# Patient Record
Sex: MALE | Race: BLACK OR AFRICAN AMERICAN | NOT HISPANIC OR LATINO | Employment: FULL TIME | URBAN - METROPOLITAN AREA
[De-identification: names, ages, dates, MRNs, and addresses within clinical notes are randomized per-mention and may not be internally consistent; named-entity substitution may affect disease eponyms.]

---

## 2018-03-27 LAB
ABSOL LYMPHOCYTES (HISTORICAL): 1.6 K/UL (ref 0.5–4)
ANION GAP SERPL CALCULATED.3IONS-SCNC: 13 MMOL/L (ref 5–14)
BASOPHILS # BLD AUTO: 0.1 K/UL (ref 0–0.1)
BASOPHILS # BLD AUTO: 1 % (ref 0–1)
BUN SERPL-MCNC: 13 MG/DL (ref 5–25)
CALCIUM SERPL-MCNC: 9.6 MG/DL (ref 8.4–10.2)
CHLORIDE SERPL-SCNC: 106 MEQ/L (ref 97–108)
CO2 SERPL-SCNC: 24 MMOL/L (ref 22–30)
COMMENT (HISTORICAL): ABNORMAL
CREATINE, SERUM (HISTORICAL): 0.79 MG/DL (ref 0.7–1.5)
DEPRECATED RDW RBC AUTO: 13.8 %
EGFR (HISTORICAL): >60 ML/MIN/1.73 M2
EOSINOPHIL # BLD AUTO: 0.2 K/UL (ref 0–0.4)
EOSINOPHIL NFR BLD AUTO: 4 % (ref 0–6)
GLUCOSE SERPL-MCNC: 84 MG/DL (ref 70–99)
HCT VFR BLD AUTO: 42.2 % (ref 41–53)
HGB BLD-MCNC: 14.4 G/DL (ref 13.5–17.5)
LYMPHOCYTES NFR BLD AUTO: 26 % (ref 25–45)
MCH RBC QN AUTO: 33.3 PG (ref 26–34)
MCHC RBC AUTO-ENTMCNC: 34 % (ref 31–36)
MCV RBC AUTO: 98 FL (ref 80–100)
METHYL ALCOHOL (HISTORICAL): 12 MG/DL
MONOCYTES # BLD AUTO: 0.6 K/UL (ref 0.2–0.9)
MONOCYTES NFR BLD AUTO: 10 % (ref 1–10)
NEUTROPHILS ABS COUNT (HISTORICAL): 3.5 K/UL (ref 1.8–7.8)
NEUTS SEG NFR BLD AUTO: 59 % (ref 45–65)
PLATELET # BLD AUTO: 214 K/MCL (ref 150–450)
POTASSIUM SERPL-SCNC: 4.4 MEQ/L (ref 3.6–5)
RBC # BLD AUTO: 4.31 M/MCL (ref 4.5–5.9)
RBC MORPHOLOGY (HISTORICAL): ABNORMAL
SODIUM SERPL-SCNC: 143 MEQ/L (ref 137–147)
TROPONIN I SERPL-MCNC: <0.01 NG/ML (ref 0–0.03)
WBC # BLD AUTO: 6 K/MCL (ref 4.5–11)

## 2019-01-22 ENCOUNTER — HOSPITAL ENCOUNTER (EMERGENCY)
Facility: HOSPITAL | Age: 39
Discharge: HOME | End: 2019-01-22
Attending: EMERGENCY MEDICINE | Admitting: EMERGENCY MEDICINE
Payer: COMMERCIAL

## 2019-01-22 ENCOUNTER — APPOINTMENT (EMERGENCY)
Dept: RADIOLOGY | Facility: HOSPITAL | Age: 39
End: 2019-01-22
Payer: COMMERCIAL

## 2019-01-22 VITALS
SYSTOLIC BLOOD PRESSURE: 120 MMHG | RESPIRATION RATE: 18 BRPM | DIASTOLIC BLOOD PRESSURE: 77 MMHG | WEIGHT: 144.31 LBS | TEMPERATURE: 98.3 F | HEART RATE: 85 BPM | OXYGEN SATURATION: 100 %

## 2019-01-22 DIAGNOSIS — R07.9 CHEST PAIN, UNSPECIFIED TYPE: Primary | ICD-10-CM

## 2019-01-22 PROCEDURE — 99285 EMERGENCY DEPT VISIT HI MDM: CPT

## 2019-01-22 PROCEDURE — 93005 ELECTROCARDIOGRAM TRACING: CPT

## 2019-01-22 PROCEDURE — 71046 X-RAY EXAM CHEST 2 VIEWS: CPT

## 2019-01-22 RX ORDER — PHENYTOIN SODIUM 100 MG/1
100 CAPSULE, EXTENDED RELEASE ORAL
COMMUNITY

## 2019-01-22 NOTE — ED PROVIDER NOTES
History  Chief Complaint   Patient presents with    Chest Pain     States for over a years has been having sharp sternal pains while resting that last about 2 seconds and comes about twice a month; occured today around 11  Patient is a 49-year-old male with a history of seizures who presents with a 1 year history of intermittent right-sided chest pain  Patient states the episodes were occurring approximately once a month now the last week 7 having weekly  Patient cannot cite any specific trigger for onset of pain  Describing a cramping right-sided nonradiating pain that lasts usually only a couple of seconds per patient  Patient states he usually has to stretch which will relieve the pain  Not taking medication for it  Denies any similar pain like this in prior to the onset  Does not have a PCP but recently got on to his wife's insurance is not signed up for a provider yet  Patient denies any history of high blood pressure diabetes or hyperlipidemia, but he does smoke half a pack a day  Denies any other associated symptoms dizziness shortness of breath or nausea with the pain  Prior to Admission Medications   Prescriptions Last Dose Informant Patient Reported? Taking? phenytoin (DILANTIN) 100 mg ER capsule   Yes Yes   Sig: Take 100 mg by mouth      Facility-Administered Medications: None       Past Medical History:   Diagnosis Date    Seizures ([address] )        History reviewed  No pertinent surgical history  History reviewed  No pertinent family history  I have reviewed and agree with the history as documented  Social History   Substance Use Topics    Smoking status: Current Every Day Smoker    Smokeless tobacco: Never Used    Alcohol use Yes        Review of Systems   Constitutional: Negative  HENT: Negative  Eyes: Negative  Respiratory: Negative  Negative for cough and shortness of breath  Cardiovascular: Positive for chest pain  Gastrointestinal: Negative  Negative for nausea and vomiting  Endocrine: Negative  Genitourinary: Negative  Musculoskeletal: Negative  Skin: Negative  Allergic/Immunologic: Negative  Neurological: Negative  Negative for dizziness and light-headedness  Hematological: Negative  Psychiatric/Behavioral: Negative  All other systems reviewed and are negative  Physical Exam  Physical Exam   Constitutional: He is oriented to person, place, and time  He appears well-developed and well-nourished  HENT:   Head: Normocephalic and atraumatic  Right Ear: External ear normal    Left Ear: External ear normal    Nose: Nose normal    Mouth/Throat: Oropharynx is clear and moist    Eyes: Pupils are equal, round, and reactive to light  Conjunctivae and EOM are normal    Neck: Normal range of motion  Neck supple  Cardiovascular: Normal rate, regular rhythm, normal heart sounds and intact distal pulses  Exam reveals no gallop  No murmur heard  Pulmonary/Chest: Effort normal and breath sounds normal  No respiratory distress  He has no wheezes  He has no rales  He exhibits no tenderness  Abdominal: Soft  Bowel sounds are normal    Musculoskeletal: Normal range of motion  He exhibits no edema or tenderness  Neurological: He is alert and oriented to person, place, and time  Skin: Skin is warm and dry  Capillary refill takes less than 2 seconds  Nursing note and vitals reviewed        Vital Signs  ED Triage Vitals [01/22/19 1539]   Temperature Pulse Respirations Blood Pressure SpO2   98 3 °F (36 8 °C) 85 18 120/77 100 %      Temp Source Heart Rate Source Patient Position - Orthostatic VS BP Location FiO2 (%)   Tymp Core Monitor Sitting Left arm --      Pain Score       No Pain           Vitals:    01/22/19 1539   BP: 120/77   Pulse: 85   Patient Position - Orthostatic VS: Sitting       Visual Acuity      ED Medications  Medications - No data to display    Diagnostic Studies  Results Reviewed     None                 XR chest pa & lateral   ED Interpretation by Giacomo Pratt MD (01/22 1633)   NAD  Procedures  Procedures       Phone Contacts  ED Phone Contact    ED Course  ED Course as of Jan 22 1729   Tue Jan 22, 2019   1636 One over results with patient  At this point no emergent findings  Patient had a chest back for a year on stressed follow up with the regular family doctor return to the ER for any concerns  Patient asking for a note return to work tomorrow  Will provide  MDM  Number of Diagnoses or Management Options  Chest pain, unspecified type:      Amount and/or Complexity of Data Reviewed  Tests in the radiology section of CPT®: ordered and reviewed  Tests in the medicine section of CPT®: ordered and reviewed  Independent visualization of images, tracings, or specimens: yes      CritCare Time    Disposition  Final diagnoses:   Chest pain, unspecified type     Time reflects when diagnosis was documented in both MDM as applicable and the Disposition within this note     Time User Action Codes Description Comment    1/22/2019  4:37 PM Nehal Pérez Add [R07 9] Chest pain, unspecified type       ED Disposition     ED Disposition Condition Comment    Discharge  Jennifer Judge  discharge to home/self care  Condition at discharge: Stable        Follow-up Information     Follow up With Specialties Details Why Den[facility]   [facility]  [address]  [phone]            Discharge Medication List as of 1/22/2019  4:37 PM      CONTINUE these medications which have NOT CHANGED    Details   phenytoin (DILANTIN) 100 mg ER capsule Take 100 mg by mouth, Historical Med           No discharge procedures on file      ED Provider  Electronically Signed by           Giacomo Pratt MD  01/22/19 7175

## 2019-01-22 NOTE — DISCHARGE INSTRUCTIONS

## 2019-01-22 NOTE — ED PROCEDURE NOTE
PROCEDURE  ECG 12 Lead Documentation  Date/Time: 1/22/2019 4:22 PM  Performed by: Herrera Bass  Authorized by: Herrera Bass     Indications / Diagnosis:  Cp  ECG reviewed by me, the ED Provider: yes    Patient location:  ED  Interpretation:     Interpretation: normal    Rate:     ECG rate:  69    ECG rate assessment: normal    Rhythm:     Rhythm: sinus rhythm    Ectopy:     Ectopy: none    QRS:     QRS axis:  Normal    QRS intervals:  Normal  Conduction:     Conduction: normal    ST segments:     ST segments:  Normal  T waves:     T waves: normal    Other findings:     Other findings: LVH           Benito Duval MD  01/22/19 5123

## 2019-01-23 LAB
ATRIAL RATE: 69 BPM
P AXIS: 15 DEGREES
PR INTERVAL: 132 MS
QRS AXIS: 35 DEGREES
QRSD INTERVAL: 88 MS
QT INTERVAL: 384 MS
QTC INTERVAL: 411 MS
T WAVE AXIS: 42 DEGREES
VENTRICULAR RATE: 69 BPM

## 2019-01-23 PROCEDURE — 93010 ELECTROCARDIOGRAM REPORT: CPT | Performed by: INTERNAL MEDICINE

## 2022-10-21 ENCOUNTER — APPOINTMENT (EMERGENCY)
Dept: RADIOLOGY | Facility: HOSPITAL | Age: 42
End: 2022-10-21
Payer: MEDICARE

## 2022-10-21 ENCOUNTER — HOSPITAL ENCOUNTER (EMERGENCY)
Facility: HOSPITAL | Age: 42
Discharge: HOME | End: 2022-10-21
Attending: INTERNAL MEDICINE
Payer: MEDICARE

## 2022-10-21 VITALS
DIASTOLIC BLOOD PRESSURE: 86 MMHG | TEMPERATURE: 97.6 F | HEART RATE: 71 BPM | SYSTOLIC BLOOD PRESSURE: 137 MMHG | RESPIRATION RATE: 16 BRPM | WEIGHT: 145.5 LBS

## 2022-10-21 DIAGNOSIS — M25.512 LEFT SHOULDER PAIN: Primary | ICD-10-CM
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
## 2022-10-21 PROCEDURE — 99284 EMERGENCY DEPT VISIT MOD MDM: CPT | Performed by: INTERNAL MEDICINE

## 2022-10-21 PROCEDURE — 73030 X-RAY EXAM OF SHOULDER: CPT

## 2022-10-21 PROCEDURE — 99283 EMERGENCY DEPT VISIT LOW MDM: CPT
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
## 2022-10-21 NOTE — DISCHARGE INSTRUCTIONS
We will call you regarding the final x-ray read of your left shoulder  On my read, I did not find see any fractures or dislocation  Wear sling for comfort and to decrease pain    Use Tylenol, Motrin and ice for pain relief  Avoid activities and sports that may cause further injury, stress or pain       If pain continues or worsens or fails to improve, follow up with Orthopedics, PCP or in the ER

## 2022-10-21 NOTE — Clinical Note
Azeb Gamboa was seen and treated in our emergency department on 10/21/2022  Diagnosis:     Ken Alvarado  is off the rest of the shift today  He may return on this date: If you have any questions or concerns, please don't hesitate to call        Yola Galaviz MD    ______________________________           _______________          _______________  [facility] [place]                              Date                                Time
Lukas Castellanos was seen and treated in our emergency department on 10/21/2022  Diagnosis:     Emeka Adame  is off the rest of the shift today  He may return on this date: If you have any questions or concerns, please don't hesitate to call        Joshua Baldwin MD    ______________________________           _______________          _______________  [facility] Representative                              Date                                Time
Mary Siddiqi was seen and treated in our emergency department on 10/21/2022  Diagnosis:     Casi Laird  is off the rest of the shift today  He may return on this date: If you have any questions or concerns, please don't hesitate to call        Loy Marsh MD    ______________________________           _______________          _______________  [facility] Representative                              Date                                Time
Salina Mcclure was seen and treated in our emergency department on 10/21/2022  Diagnosis:     Srikanth Gamboa  is off the rest of the shift today  He may return on this date: If you have any questions or concerns, please don't hesitate to call        Emily Daly MD    ______________________________           _______________          _______________  [address] Representative                              Date                                Time
Breath sounds clear and equal bilaterally.

## 2024-09-18 ENCOUNTER — APPOINTMENT (OUTPATIENT)
Dept: URGENT CARE | Age: 44
End: 2024-09-18